# Patient Record
Sex: FEMALE | Race: WHITE | NOT HISPANIC OR LATINO | URBAN - METROPOLITAN AREA
[De-identification: names, ages, dates, MRNs, and addresses within clinical notes are randomized per-mention and may not be internally consistent; named-entity substitution may affect disease eponyms.]

---

## 2017-08-12 ENCOUNTER — EMERGENCY (EMERGENCY)
Facility: HOSPITAL | Age: 82
LOS: 1 days | Discharge: HOME | End: 2017-08-12
Attending: EMERGENCY MEDICINE | Admitting: EMERGENCY MEDICINE
Payer: MEDICARE

## 2017-08-12 VITALS
HEART RATE: 82 BPM | RESPIRATION RATE: 18 BRPM | DIASTOLIC BLOOD PRESSURE: 85 MMHG | TEMPERATURE: 98 F | SYSTOLIC BLOOD PRESSURE: 144 MMHG | OXYGEN SATURATION: 100 %

## 2017-08-12 PROCEDURE — 99284 EMERGENCY DEPT VISIT MOD MDM: CPT | Mod: 25

## 2017-08-12 PROCEDURE — 73110 X-RAY EXAM OF WRIST: CPT | Mod: 26,RT

## 2017-08-12 PROCEDURE — 73130 X-RAY EXAM OF HAND: CPT | Mod: 26,RT

## 2017-08-12 PROCEDURE — 29125 APPL SHORT ARM SPLINT STATIC: CPT | Mod: LT

## 2017-08-12 RX ORDER — ACETAMINOPHEN 500 MG
650 TABLET ORAL ONCE
Qty: 0 | Refills: 0 | Status: COMPLETED | OUTPATIENT
Start: 2017-08-12 | End: 2017-08-12

## 2017-08-12 RX ADMIN — Medication 650 MILLIGRAM(S): at 19:44

## 2017-08-12 NOTE — ED PROVIDER NOTE - UPPER EXTREMITY EXAM, RIGHT
No shoulder or elbow tenderness, full ROM. Slight pinpoint tenderness to base of R first digit. Able to flex digits. Able to make fist, able to touch thumb to each of other digits./TENDERNESS

## 2017-08-12 NOTE — ED PROVIDER NOTE - OBJECTIVE STATEMENT
81y/o F with PMHx of bronchitis, glaucoma, macular degeneration, presents to the ED s/p R wrist injury 3h ago c/o pain, numbness. She was playing volleyball today when she hit the ball in an uncomfortable way, causing immediate pain. She has not taken anything for the pain PTA. Denies elbow, shoulder pain, fever, vomiting, any other complaints. Allergic to penicillin, Darvon, Naprosyn, atorvastatin, ketoprofen, Pulmicort, Avelox, Darvocet.

## 2017-08-12 NOTE — ED PROVIDER NOTE - CARE PLAN
Principal Discharge DX:	Distal radial fracture  Instructions for follow-up, activity and diet:	take motrin or tylenol every 6-8 hours as needed for pain. Keep hand in splint. Follow up with an orthopedic within 1-2 weeks. return to ED for any worsening pain, swelling, fever or numbness.

## 2017-08-12 NOTE — ED PROVIDER NOTE - PROGRESS NOTE DETAILS
LAKSHMI Caldera: received sign out from Dr. Joel. Pt noted to have nondisplaced distal radial fracture. volar splint applied. will dc home with ortho follow up.

## 2017-08-12 NOTE — ED PROVIDER NOTE - PLAN OF CARE
take motrin or tylenol every 6-8 hours as needed for pain. Keep hand in splint. Follow up with an orthopedic within 1-2 weeks. return to ED for any worsening pain, swelling, fever or numbness.

## 2017-08-12 NOTE — ED PROVIDER NOTE - MEDICAL DECISION MAKING DETAILS
81y/o F presents to the ED with R wrist injury today s/p hitting a volleyball. R/o Fx. Plan: XR hand/wrist, tylenol, f/u with PMD.

## 2017-08-14 ENCOUNTER — EMERGENCY (EMERGENCY)
Facility: HOSPITAL | Age: 82
LOS: 1 days | Discharge: HOME | End: 2017-08-14
Attending: EMERGENCY MEDICINE | Admitting: EMERGENCY MEDICINE
Payer: MEDICARE

## 2017-08-14 VITALS
HEART RATE: 85 BPM | RESPIRATION RATE: 18 BRPM | SYSTOLIC BLOOD PRESSURE: 119 MMHG | TEMPERATURE: 98 F | DIASTOLIC BLOOD PRESSURE: 77 MMHG | OXYGEN SATURATION: 98 %

## 2017-08-14 PROCEDURE — 99283 EMERGENCY DEPT VISIT LOW MDM: CPT

## 2017-08-14 RX ORDER — ACETAMINOPHEN 500 MG
650 TABLET ORAL ONCE
Qty: 0 | Refills: 0 | Status: COMPLETED | OUTPATIENT
Start: 2017-08-14 | End: 2017-08-14

## 2017-08-14 RX ADMIN — Medication 650 MILLIGRAM(S): at 16:34

## 2017-08-14 NOTE — ED PROVIDER NOTE - OBJECTIVE STATEMENT
82 year-old female presents to the Emergency Department for right wrist discomfort.  Patient was seen in the ED Saturday night (2 days ago) and a ulnar gutter splint was applied for a non-displaced radial head fracture.  Patient mentions numbness/tingling in her fingers, swelling of her thumb, and the cast digging into her elbow.  Patient did not try to alleviate any pressure by undoing the cast.  Has not made any appointment with orthopedics.  No fevers, chills, nausea, vomiting.  Has preserved sensation and is able to move fingers.

## 2017-08-14 NOTE — ED PROVIDER NOTE - ATTENDING CONTRIBUTION TO CARE
I performed a face to face bedside interview with patient regarding history of present illness, review of symptoms and past medical history. I completed an independent physical exam.  I have discussed patient's plan of care.   I agree with note as stated above, having amended the EMR as needed to reflect my findings. I have discussed the assessment and plan of care.  This includes during the time I functioned as the attending physician for this patient.  Attending Contribution to Care: agree with plan of resident, pt requires re adjustment of splint, pt with persistent snuff box tenderness. sig symptomatic improvement s/p removal of splint and re application

## 2017-08-14 NOTE — ED PROVIDER NOTE - CARE PLAN
Principal Discharge DX:	Wrist pain, right  Instructions for follow-up, activity and diet:	Follow-up with your Primary Care Physician within 24-48 hours.  Please return to the Emergency Department immediately for any new (fever, redness of joint, breakdown of skin), worsening or concerning symptoms; specifically those included in the attached information brochure.     Follow-up with orthopedics for further management of fracture.  Can use 1000mg Tylenol every 6 hours for pain - as needed.  This is an over-the-counter medications - please respect the warnings on the label.

## 2017-08-14 NOTE — ED PROVIDER NOTE - PLAN OF CARE
Follow-up with your Primary Care Physician within 24-48 hours.  Please return to the Emergency Department immediately for any new (fever, redness of joint, breakdown of skin), worsening or concerning symptoms; specifically those included in the attached information brochure.     Follow-up with orthopedics for further management of fracture.  Can use 1000mg Tylenol every 6 hours for pain - as needed.  This is an over-the-counter medications - please respect the warnings on the label.

## 2017-08-14 NOTE — ED PROVIDER NOTE - MEDICAL DECISION MAKING DETAILS
82 year-old female presents to the Emergency Department for wrist pain status-post splint placement 2 days ago.  Patient is neurovascularly intact with no s/s of inflamed joint or skin ulceration.  Plan to place patient in a looser splint and f/u with orthopedics.  Pain control as needed with Tylenol.

## 2017-09-05 ENCOUNTER — APPOINTMENT (OUTPATIENT)
Dept: ORTHOPEDIC SURGERY | Facility: CLINIC | Age: 82
End: 2017-09-05

## 2021-02-02 ENCOUNTER — APPOINTMENT (OUTPATIENT)
Dept: OTOLARYNGOLOGY | Facility: CLINIC | Age: 86
End: 2021-02-02
